# Patient Record
Sex: FEMALE | Race: WHITE
[De-identification: names, ages, dates, MRNs, and addresses within clinical notes are randomized per-mention and may not be internally consistent; named-entity substitution may affect disease eponyms.]

---

## 2017-11-16 ENCOUNTER — RESULT REVIEW (OUTPATIENT)
Age: 62
End: 2017-11-16

## 2020-06-25 ENCOUNTER — APPOINTMENT (OUTPATIENT)
Dept: PEDIATRIC ALLERGY IMMUNOLOGY | Facility: CLINIC | Age: 65
End: 2020-06-25

## 2020-06-25 PROBLEM — Z00.00 ENCOUNTER FOR PREVENTIVE HEALTH EXAMINATION: Status: ACTIVE | Noted: 2020-06-25

## 2020-07-08 ENCOUNTER — RECORD ABSTRACTING (OUTPATIENT)
Age: 65
End: 2020-07-08

## 2020-07-08 RX ORDER — BUDESONIDE AND FORMOTEROL FUMARATE DIHYDRATE 160; 4.5 UG/1; UG/1
160-4.5 AEROSOL RESPIRATORY (INHALATION) TWICE DAILY
Refills: 0 | Status: ACTIVE | COMMUNITY

## 2020-07-08 RX ORDER — MONTELUKAST SODIUM 10 MG/1
10 TABLET, FILM COATED ORAL DAILY
Refills: 0 | Status: ACTIVE | COMMUNITY

## 2020-07-09 ENCOUNTER — APPOINTMENT (OUTPATIENT)
Dept: PEDIATRIC ALLERGY IMMUNOLOGY | Facility: CLINIC | Age: 65
End: 2020-07-09
Payer: COMMERCIAL

## 2020-07-09 VITALS
OXYGEN SATURATION: 97 % | HEART RATE: 75 BPM | SYSTOLIC BLOOD PRESSURE: 136 MMHG | DIASTOLIC BLOOD PRESSURE: 86 MMHG | RESPIRATION RATE: 20 BRPM

## 2020-07-09 PROCEDURE — 96401 CHEMO ANTI-NEOPL SQ/IM: CPT | Mod: 76

## 2020-07-09 RX ORDER — OMALIZUMAB 202.5 MG/1.4ML
150 INJECTION, SOLUTION SUBCUTANEOUS
Qty: 1 | Refills: 0 | Status: COMPLETED | OUTPATIENT
Start: 2020-07-09

## 2020-08-06 ENCOUNTER — APPOINTMENT (OUTPATIENT)
Dept: PEDIATRIC ALLERGY IMMUNOLOGY | Facility: CLINIC | Age: 65
End: 2020-08-06
Payer: COMMERCIAL

## 2020-08-06 VITALS
SYSTOLIC BLOOD PRESSURE: 122 MMHG | OXYGEN SATURATION: 96 % | RESPIRATION RATE: 20 BRPM | DIASTOLIC BLOOD PRESSURE: 75 MMHG | HEART RATE: 75 BPM

## 2020-08-06 PROCEDURE — 96401 CHEMO ANTI-NEOPL SQ/IM: CPT | Mod: 76

## 2020-08-06 RX ORDER — OMALIZUMAB 202.5 MG/1.4ML
150 INJECTION, SOLUTION SUBCUTANEOUS
Qty: 1 | Refills: 0 | Status: COMPLETED | OUTPATIENT
Start: 2020-08-06

## 2020-08-06 RX ORDER — CETIRIZINE HYDROCHLORIDE 10 MG/1
10 TABLET, FILM COATED ORAL
Qty: 30 | Refills: 3 | Status: ACTIVE | COMMUNITY
Start: 2020-08-06 | End: 1900-01-01

## 2020-08-06 RX ORDER — FEXOFENADINE HCL 60 MG
CAPSULE ORAL
Refills: 0 | Status: DISCONTINUED | COMMUNITY
End: 2020-08-06

## 2020-09-08 ENCOUNTER — APPOINTMENT (OUTPATIENT)
Dept: PEDIATRIC ALLERGY IMMUNOLOGY | Facility: CLINIC | Age: 65
End: 2020-09-08
Payer: COMMERCIAL

## 2020-09-08 VITALS
OXYGEN SATURATION: 94 % | RESPIRATION RATE: 20 BRPM | SYSTOLIC BLOOD PRESSURE: 123 MMHG | HEART RATE: 74 BPM | DIASTOLIC BLOOD PRESSURE: 80 MMHG

## 2020-09-08 PROCEDURE — 96372 THER/PROPH/DIAG INJ SC/IM: CPT

## 2020-09-08 PROCEDURE — 99213 OFFICE O/P EST LOW 20 MIN: CPT | Mod: 25

## 2020-09-08 RX ORDER — FLUTICASONE PROPIONATE 50 UG/1
50 SPRAY, METERED NASAL DAILY
Qty: 1 | Refills: 5 | Status: ACTIVE | COMMUNITY
Start: 2020-09-08 | End: 1900-01-01

## 2020-09-08 RX ORDER — FEXOFENADINE HCL 180 MG
180 TABLET ORAL
Refills: 0 | Status: ACTIVE | COMMUNITY

## 2020-09-08 RX ORDER — FLUTICASONE FUROATE 27.5 UG/1
27.5 SPRAY, METERED NASAL DAILY
Qty: 1 | Refills: 3 | Status: DISCONTINUED | COMMUNITY
End: 2020-09-08

## 2020-09-08 RX ORDER — EPINEPHRINE 0.3 MG/.3ML
0.3 INJECTION INTRAMUSCULAR
Qty: 1 | Refills: 1 | Status: ACTIVE | COMMUNITY
Start: 2020-09-08 | End: 1900-01-01

## 2020-09-08 RX ORDER — FLUTICASONE PROPIONATE 50 UG/1
50 SPRAY, METERED NASAL DAILY
Qty: 1 | Refills: 3 | Status: ACTIVE | COMMUNITY
Start: 2020-09-08 | End: 1900-01-01

## 2020-09-08 RX ORDER — OMALIZUMAB 202.5 MG/1.4ML
150 INJECTION, SOLUTION SUBCUTANEOUS
Qty: 1 | Refills: 0 | Status: COMPLETED | OUTPATIENT
Start: 2020-09-08

## 2020-09-08 NOTE — HISTORY OF PRESENT ILLNESS
[Eczematous rashes] : eczematous rashes [de-identified] : 65 year old with long standing history of chronic urticaria, moderate persistent asthma and allergic rhinitis.  Pt had severe chronic urticaria but began Xolair 150 mg q4 weeks in 2013 and has had a significant decrease in hives since then.  She has tried on several occasions to increase her interval between Xolair shots but if she goes > than 5 weeks, increase hives result. She still has mild interment rhinitis even with treatment with Allegra 180 mg qd and Flonase 2s qad along with Singulair 10 mg qd. Occasional dosages of Patatday are required as well. Moderate persistent asthma is noted but well controlled with Symbicort 160 2p bid.   [Food Allergies] : food allergies

## 2020-09-08 NOTE — SOCIAL HISTORY
[Father] : father [College] : College [House] : [unfilled] lives in a house  [Central Forced Air] : heating provided by central forced air [Central] : air conditioning provided by central unit [None] : none [] :  [FreeTextEntry2] : nurse [Humidifier] : does not use a humidifier [Dehumidifier] : does not use a dehumidifier [Dust Mite Covers] : does not have dust mite covers [Feather Pillows] : does not have feather pillows [Feather Comforter] : does not have a feather comforter [Bedroom] : not in the bedroom [Living Area] : not in the living area [Smokers in Household] : there are no smokers in the home [de-identified] : reading,photography,golf

## 2020-09-08 NOTE — ASSESSMENT
[FreeTextEntry1] : 65 year old with moderate persistent asthma, allergic rhinitis, chronic urticaria all of which are relatively well controlled on Xolair 150 q4 weeks.  If she tries to stop Xolair increase complaints of hives and itching result.  She is stable on all her other asthma meds\par Nilo Carnes MD, FAAP, FAAAAI\par [facility]\par [facility]\par [facility]\par [facility] [place]\par [address] [place]  [address]\par [address] [place] [address], [address]  [address]\par [phone]\par

## 2020-09-08 NOTE — PHYSICAL EXAM
[Alert] : alert [No Acute Distress] : no acute distress [Normal Pupil & Iris Size/Symmetry] : normal pupil and iris size and symmetry [Sclera Not Icteric] : sclera not icteric [No Thrush] : no thrush [Boggy Nasal Turbinates] : no boggy and/or pale nasal turbinates [Posterior Pharyngeal Cobblestoning] : no posterior pharyngeal cobblestoning [No Neck Mass] : no neck mass was observed [Regular Rhythm] : with a regular rhythm [Normal Rate] : heart rate was normal  [Skin Intact] : skin intact  [No Rash] : no rash [Eczematous Patches] : no eczematous patches [Xerosis] : no xerosis [Erythematous] : not erythematous

## 2020-09-08 NOTE — REVIEW OF SYSTEMS
[Eye Itching] : itchy eyes [Sneezing] : sneezing [Urticaria] : urticaria [Pruritus] : pruritus [Nl] : Cardiovascular

## 2020-09-08 NOTE — REASON FOR VISIT
[Routine Follow-Up] : a routine follow-up visit for [Allergy Evaluation/ Skin Testing] : allergy evaluation and or skin testing [Itchy Eyes] : itchy eyes [Hives] : hives [Congestion] : congestion

## 2020-10-15 ENCOUNTER — APPOINTMENT (OUTPATIENT)
Dept: PEDIATRIC ALLERGY IMMUNOLOGY | Facility: CLINIC | Age: 65
End: 2020-10-15
Payer: COMMERCIAL

## 2020-10-15 VITALS
OXYGEN SATURATION: 96 % | RESPIRATION RATE: 20 BRPM | SYSTOLIC BLOOD PRESSURE: 128 MMHG | DIASTOLIC BLOOD PRESSURE: 80 MMHG | HEART RATE: 75 BPM

## 2020-10-15 PROCEDURE — 96372 THER/PROPH/DIAG INJ SC/IM: CPT

## 2020-10-15 RX ORDER — OMALIZUMAB 202.5 MG/1.4ML
150 INJECTION, SOLUTION SUBCUTANEOUS
Qty: 1 | Refills: 0 | Status: COMPLETED | OUTPATIENT
Start: 2020-10-15

## 2020-10-15 RX ADMIN — OMALIZUMAB 0 MG: 202.5 INJECTION, SOLUTION SUBCUTANEOUS at 00:00

## 2020-11-09 ENCOUNTER — APPOINTMENT (OUTPATIENT)
Dept: PEDIATRIC ALLERGY IMMUNOLOGY | Facility: CLINIC | Age: 65
End: 2020-11-09
Payer: COMMERCIAL

## 2020-11-09 VITALS
RESPIRATION RATE: 16 BRPM | SYSTOLIC BLOOD PRESSURE: 124 MMHG | OXYGEN SATURATION: 98 % | DIASTOLIC BLOOD PRESSURE: 79 MMHG | HEART RATE: 76 BPM

## 2020-11-09 PROCEDURE — 96401 CHEMO ANTI-NEOPL SQ/IM: CPT | Mod: 76

## 2020-11-09 PROCEDURE — 99072 ADDL SUPL MATRL&STAF TM PHE: CPT

## 2020-11-09 RX ORDER — OMALIZUMAB 202.5 MG/1.4ML
150 INJECTION, SOLUTION SUBCUTANEOUS
Qty: 1 | Refills: 0 | Status: COMPLETED | OUTPATIENT
Start: 2020-11-09

## 2020-11-23 ENCOUNTER — TRANSCRIPTION ENCOUNTER (OUTPATIENT)
Age: 65
End: 2020-11-23

## 2020-12-09 ENCOUNTER — APPOINTMENT (OUTPATIENT)
Dept: PEDIATRIC ALLERGY IMMUNOLOGY | Facility: CLINIC | Age: 65
End: 2020-12-09
Payer: COMMERCIAL

## 2020-12-09 VITALS
OXYGEN SATURATION: 99 % | HEART RATE: 82 BPM | DIASTOLIC BLOOD PRESSURE: 78 MMHG | SYSTOLIC BLOOD PRESSURE: 148 MMHG | RESPIRATION RATE: 16 BRPM

## 2020-12-09 DIAGNOSIS — L50.8 OTHER URTICARIA: ICD-10-CM

## 2020-12-09 PROCEDURE — 99213 OFFICE O/P EST LOW 20 MIN: CPT | Mod: 25

## 2020-12-09 PROCEDURE — 99072 ADDL SUPL MATRL&STAF TM PHE: CPT

## 2020-12-09 PROCEDURE — 96401 CHEMO ANTI-NEOPL SQ/IM: CPT | Mod: 76

## 2020-12-09 RX ORDER — AZELASTINE HYDROCHLORIDE 205.5 UG/1
0.15 SPRAY, METERED NASAL
Qty: 1 | Refills: 5 | Status: ACTIVE | COMMUNITY
Start: 2020-12-09 | End: 1900-01-01

## 2020-12-09 RX ORDER — OMALIZUMAB 202.5 MG/1.4ML
150 INJECTION, SOLUTION SUBCUTANEOUS
Qty: 1 | Refills: 0 | Status: COMPLETED | OUTPATIENT
Start: 2020-12-09

## 2020-12-09 RX ORDER — ALBUTEROL SULFATE 90 UG/1
108 (90 BASE) INHALANT RESPIRATORY (INHALATION)
Qty: 1 | Refills: 3 | Status: ACTIVE | COMMUNITY
Start: 2020-12-09 | End: 1900-01-01

## 2020-12-09 NOTE — PHYSICAL EXAM
[Alert] : alert [Well Nourished] : well nourished [No Discharge] : no discharge [Normal TMs] : both tympanic membranes were normal [Boggy Nasal Turbinates] : no boggy and/or pale nasal turbinates [Posterior Pharyngeal Cobblestoning] : posterior pharyngeal cobblestoning [Normal Rate and Effort] : normal respiratory rhythm and effort [No Crackles] : no crackles [Wheezing] : no wheezing was heard [Normal Rate] : heart rate was normal  [Normal Cervical Lymph Nodes] : cervical [Skin Intact] : skin intact  [No Rash] : no rash

## 2020-12-09 NOTE — ASSESSMENT
[FreeTextEntry1] : 65 yr old with CIU doing very well on Xolair now compaltins of increase rhintis, SOB with mask wearing but no wheezing. Mild cough\par Exam today was normal\par Suggest trial of Proair PRN along with adding azelastine 0.15% 2s qd to routine\par Pt will call in one week\par

## 2020-12-09 NOTE — REASON FOR VISIT
[Routine Follow-Up] : a routine follow-up visit for [Asthma] : asthma [Shortness of Breath] : shortness of breath

## 2020-12-09 NOTE — REVIEW OF SYSTEMS
[Rhinorrhea] : rhinorrhea [Nasal Congestion] : nasal congestion [Post Nasal Drip] : post nasal drip [SOB at Rest] : shortness of breath at rest [Cough] : cough [Nl] : Integumentary

## 2020-12-09 NOTE — HISTORY OF PRESENT ILLNESS
[de-identified] : 65 y old with long standing history of chronic urticaria doing very well on Xolair q 4 weeks. As she approaches her next dose increase itching is noted. Recently she has had complaints of increased sensation of shortness of breath, post nasal drip and coughing while wearing a mask at work.  She remains on her Symbicort 160 2p bid, Singulair 10 mg qd, Flonase, and Zyrtec. She does not have a rescue inhaler and has never used azelastine. She continues to have increased rhinitis and post nasal drip.

## 2021-01-11 ENCOUNTER — APPOINTMENT (OUTPATIENT)
Dept: PEDIATRIC ALLERGY IMMUNOLOGY | Facility: CLINIC | Age: 66
End: 2021-01-11
Payer: COMMERCIAL

## 2021-01-11 VITALS
DIASTOLIC BLOOD PRESSURE: 86 MMHG | RESPIRATION RATE: 16 BRPM | OXYGEN SATURATION: 97 % | SYSTOLIC BLOOD PRESSURE: 151 MMHG | HEART RATE: 67 BPM

## 2021-01-11 DIAGNOSIS — J45.40 MODERATE PERSISTENT ASTHMA, UNCOMPLICATED: ICD-10-CM

## 2021-01-11 DIAGNOSIS — J30.1 ALLERGIC RHINITIS DUE TO POLLEN: ICD-10-CM

## 2021-01-11 PROCEDURE — 96401 CHEMO ANTI-NEOPL SQ/IM: CPT | Mod: 76

## 2021-01-11 PROCEDURE — 99072 ADDL SUPL MATRL&STAF TM PHE: CPT

## 2021-01-11 RX ORDER — OMALIZUMAB 202.5 MG/1.4ML
150 INJECTION, SOLUTION SUBCUTANEOUS
Qty: 1 | Refills: 0 | Status: COMPLETED | OUTPATIENT
Start: 2021-01-11

## 2021-01-11 RX ADMIN — OMALIZUMAB 0 MG: 202.5 INJECTION, SOLUTION SUBCUTANEOUS at 00:00

## 2021-01-12 ENCOUNTER — RX RENEWAL (OUTPATIENT)
Age: 66
End: 2021-01-12

## 2021-01-12 PROBLEM — J30.1 ALLERGIC RHINITIS DUE TO POLLEN: Status: ACTIVE | Noted: 2020-09-08

## 2021-01-12 PROBLEM — J45.40 ASTHMA, MODERATE PERSISTENT: Status: ACTIVE | Noted: 2020-09-08

## 2021-01-12 RX ORDER — MONTELUKAST 10 MG/1
10 TABLET, FILM COATED ORAL DAILY
Qty: 90 | Refills: 2 | Status: ACTIVE | COMMUNITY
Start: 2021-01-12 | End: 1900-01-01

## 2021-01-12 RX ORDER — CETIRIZINE HYDROCHLORIDE 10 MG/1
10 TABLET, COATED ORAL
Qty: 90 | Refills: 2 | Status: ACTIVE | COMMUNITY
Start: 2021-01-12 | End: 1900-01-01

## 2021-01-12 RX ORDER — BUDESONIDE AND FORMOTEROL FUMARATE DIHYDRATE 160; 4.5 UG/1; UG/1
160-4.5 AEROSOL RESPIRATORY (INHALATION) TWICE DAILY
Qty: 3 | Refills: 2 | Status: ACTIVE | COMMUNITY
Start: 2021-01-12 | End: 1900-01-01

## 2021-01-12 RX ORDER — FLUTICASONE PROPIONATE 50 UG/1
50 SPRAY, METERED NASAL DAILY
Qty: 48 | Refills: 2 | Status: ACTIVE | COMMUNITY
Start: 2021-01-12 | End: 1900-01-01

## 2021-02-15 ENCOUNTER — APPOINTMENT (OUTPATIENT)
Dept: PEDIATRIC ALLERGY IMMUNOLOGY | Facility: CLINIC | Age: 66
End: 2021-02-15
Payer: COMMERCIAL

## 2021-02-15 VITALS
DIASTOLIC BLOOD PRESSURE: 86 MMHG | RESPIRATION RATE: 20 BRPM | HEART RATE: 64 BPM | SYSTOLIC BLOOD PRESSURE: 133 MMHG | OXYGEN SATURATION: 96 %

## 2021-02-15 PROCEDURE — 99072 ADDL SUPL MATRL&STAF TM PHE: CPT

## 2021-02-15 PROCEDURE — 96401 CHEMO ANTI-NEOPL SQ/IM: CPT | Mod: 76

## 2021-02-15 RX ORDER — OMALIZUMAB 202.5 MG/1.4ML
150 INJECTION, SOLUTION SUBCUTANEOUS
Qty: 1 | Refills: 0 | Status: COMPLETED | OUTPATIENT
Start: 2021-02-15

## 2021-03-18 ENCOUNTER — APPOINTMENT (OUTPATIENT)
Dept: PEDIATRIC ALLERGY IMMUNOLOGY | Facility: CLINIC | Age: 66
End: 2021-03-18
Payer: COMMERCIAL

## 2021-03-18 VITALS
RESPIRATION RATE: 16 BRPM | DIASTOLIC BLOOD PRESSURE: 93 MMHG | SYSTOLIC BLOOD PRESSURE: 156 MMHG | HEART RATE: 66 BPM | OXYGEN SATURATION: 98 %

## 2021-03-18 PROCEDURE — 96401 CHEMO ANTI-NEOPL SQ/IM: CPT | Mod: 76

## 2021-03-18 PROCEDURE — 99072 ADDL SUPL MATRL&STAF TM PHE: CPT

## 2021-03-18 RX ORDER — OMALIZUMAB 202.5 MG/1.4ML
150 INJECTION, SOLUTION SUBCUTANEOUS
Qty: 1 | Refills: 0 | Status: COMPLETED | OUTPATIENT
Start: 2021-03-18

## 2021-03-18 RX ADMIN — OMALIZUMAB 0 MG: 202.5 INJECTION, SOLUTION SUBCUTANEOUS at 00:00

## 2021-04-13 ENCOUNTER — APPOINTMENT (OUTPATIENT)
Dept: PEDIATRIC ALLERGY IMMUNOLOGY | Facility: CLINIC | Age: 66
End: 2021-04-13
Payer: COMMERCIAL

## 2021-04-13 VITALS
OXYGEN SATURATION: 96 % | DIASTOLIC BLOOD PRESSURE: 84 MMHG | SYSTOLIC BLOOD PRESSURE: 133 MMHG | HEART RATE: 64 BPM | RESPIRATION RATE: 16 BRPM

## 2021-04-13 PROCEDURE — 99072 ADDL SUPL MATRL&STAF TM PHE: CPT

## 2021-04-13 PROCEDURE — 96401 CHEMO ANTI-NEOPL SQ/IM: CPT | Mod: 76

## 2021-04-13 RX ORDER — OMALIZUMAB 202.5 MG/1.4ML
150 INJECTION, SOLUTION SUBCUTANEOUS
Qty: 1 | Refills: 0 | Status: COMPLETED | OUTPATIENT
Start: 2021-04-13

## 2021-05-18 ENCOUNTER — APPOINTMENT (OUTPATIENT)
Dept: PEDIATRIC ALLERGY IMMUNOLOGY | Facility: CLINIC | Age: 66
End: 2021-05-18
Payer: COMMERCIAL

## 2021-05-18 VITALS
HEART RATE: 73 BPM | DIASTOLIC BLOOD PRESSURE: 82 MMHG | SYSTOLIC BLOOD PRESSURE: 153 MMHG | OXYGEN SATURATION: 95 % | RESPIRATION RATE: 20 BRPM

## 2021-05-18 DIAGNOSIS — L50.8 OTHER URTICARIA: ICD-10-CM

## 2021-05-18 PROCEDURE — 99072 ADDL SUPL MATRL&STAF TM PHE: CPT

## 2021-05-18 PROCEDURE — 96401 CHEMO ANTI-NEOPL SQ/IM: CPT | Mod: 76

## 2021-05-18 RX ORDER — OMALIZUMAB 202.5 MG/1.4ML
150 INJECTION, SOLUTION SUBCUTANEOUS
Qty: 1 | Refills: 0 | Status: COMPLETED | OUTPATIENT
Start: 2021-05-18

## 2022-02-09 ENCOUNTER — NON-APPOINTMENT (OUTPATIENT)
Age: 67
End: 2022-02-09